# Patient Record
Sex: FEMALE | URBAN - METROPOLITAN AREA
[De-identification: names, ages, dates, MRNs, and addresses within clinical notes are randomized per-mention and may not be internally consistent; named-entity substitution may affect disease eponyms.]

---

## 2021-08-03 ENCOUNTER — OFFICE VISIT (OUTPATIENT)
Dept: URBAN - METROPOLITAN AREA CLINIC 78 | Facility: CLINIC | Age: 41
End: 2021-08-03

## 2021-09-17 ENCOUNTER — OFFICE VISIT (OUTPATIENT)
Dept: URBAN - METROPOLITAN AREA CLINIC 78 | Facility: CLINIC | Age: 41
End: 2021-09-17

## 2021-09-17 NOTE — PHYSICAL EXAM NECK/THYROID:
Called patient and left message on answering machine to return phone call.    normal appearance , without tenderness upon palpation , no deformities , trachea midline , Thyroid normal size , no thyroid nodules , no masses , no JVD , thyroid nontender

## 2021-09-17 NOTE — HPI-TODAY'S VISIT:
The patient was referred to us by Dr. Lora Cosme for a FH of colon cancer in the patient's mother. A copy of this note will be sent to the referring physician.  The patient has never had a colonoscopy previously. There is a FH of colon cancer in her mother, or colon polyps.   There is no recent history of rectal bleeding. The patient has no pertinent additional complaints of abdominal pain, constipation, diarrhea, bloating, rectal pain, anorexia or unintentional weight loss.  Regarding any upper GI complaints, the patient has not had heartburn, nausea, vomiting or dysphagia.   The patient does not take blood thinners.

## 2021-09-17 NOTE — PHYSICAL EXAM NEUROLOGIC:
oriented to person, place and time , normal sensation , short and long term memory intact cardiac cath/ unstable angina CCS3